# Patient Record
Sex: MALE | Race: WHITE | NOT HISPANIC OR LATINO | Employment: FULL TIME | URBAN - METROPOLITAN AREA
[De-identification: names, ages, dates, MRNs, and addresses within clinical notes are randomized per-mention and may not be internally consistent; named-entity substitution may affect disease eponyms.]

---

## 2023-08-15 ENCOUNTER — HOSPITAL ENCOUNTER (EMERGENCY)
Facility: HOSPITAL | Age: 53
Discharge: HOME | End: 2023-08-15
Attending: EMERGENCY MEDICINE
Payer: COMMERCIAL

## 2023-08-15 VITALS
BODY MASS INDEX: 39.24 KG/M2 | TEMPERATURE: 98 F | OXYGEN SATURATION: 98 % | RESPIRATION RATE: 19 BRPM | SYSTOLIC BLOOD PRESSURE: 128 MMHG | WEIGHT: 250 LBS | HEART RATE: 75 BPM | HEIGHT: 67 IN | DIASTOLIC BLOOD PRESSURE: 71 MMHG

## 2023-08-15 DIAGNOSIS — N23 RENAL COLIC: Primary | ICD-10-CM

## 2023-08-15 DIAGNOSIS — N20.0 KIDNEY STONE: ICD-10-CM

## 2023-08-15 LAB
ALBUMIN SERPL-MCNC: 4.4 G/DL (ref 3.5–5)
ALBUMIN/GLOB SERPL: 1.5 RATIO (ref 1.1–2)
ALP SERPL-CCNC: 42 UNIT/L (ref 40–150)
ALT SERPL-CCNC: 34 UNIT/L (ref 0–55)
APPEARANCE UR: CLEAR
AST SERPL-CCNC: 22 UNIT/L (ref 5–34)
BACTERIA #/AREA URNS AUTO: ABNORMAL /HPF
BASOPHILS # BLD AUTO: 0.05 X10(3)/MCL
BASOPHILS NFR BLD AUTO: 0.6 %
BILIRUB SERPL-MCNC: 0.7 MG/DL
BILIRUB UR QL STRIP.AUTO: NEGATIVE
BUN SERPL-MCNC: 7.2 MG/DL (ref 8.4–25.7)
CALCIUM SERPL-MCNC: 9.6 MG/DL (ref 8.4–10.2)
CHLORIDE SERPL-SCNC: 104 MMOL/L (ref 98–107)
CO2 SERPL-SCNC: 22 MMOL/L (ref 22–29)
COLOR UR: YELLOW
CREAT SERPL-MCNC: 0.79 MG/DL (ref 0.73–1.18)
EOSINOPHIL # BLD AUTO: 0.19 X10(3)/MCL (ref 0–0.9)
EOSINOPHIL NFR BLD AUTO: 2.3 %
ERYTHROCYTE [DISTWIDTH] IN BLOOD BY AUTOMATED COUNT: 13.7 % (ref 11.5–17)
GFR SERPLBLD CREATININE-BSD FMLA CKD-EPI: >60 MLS/MIN/1.73/M2
GLOBULIN SER-MCNC: 2.9 GM/DL (ref 2.4–3.5)
GLUCOSE SERPL-MCNC: 140 MG/DL (ref 74–100)
GLUCOSE UR QL STRIP.AUTO: NEGATIVE
HCT VFR BLD AUTO: 38.3 % (ref 42–52)
HGB BLD-MCNC: 13.3 G/DL (ref 14–18)
IMM GRANULOCYTES # BLD AUTO: 0.04 X10(3)/MCL (ref 0–0.04)
IMM GRANULOCYTES NFR BLD AUTO: 0.5 %
KETONES UR QL STRIP.AUTO: NEGATIVE
LEUKOCYTE ESTERASE UR QL STRIP.AUTO: NEGATIVE
LYMPHOCYTES # BLD AUTO: 2.83 X10(3)/MCL (ref 0.6–4.6)
LYMPHOCYTES NFR BLD AUTO: 34.7 %
MCH RBC QN AUTO: 29.4 PG (ref 27–31)
MCHC RBC AUTO-ENTMCNC: 34.7 G/DL (ref 33–36)
MCV RBC AUTO: 84.7 FL (ref 80–94)
MONOCYTES # BLD AUTO: 0.53 X10(3)/MCL (ref 0.1–1.3)
MONOCYTES NFR BLD AUTO: 6.5 %
NEUTROPHILS # BLD AUTO: 4.52 X10(3)/MCL (ref 2.1–9.2)
NEUTROPHILS NFR BLD AUTO: 55.4 %
NITRITE UR QL STRIP.AUTO: NEGATIVE
NRBC BLD AUTO-RTO: 0 %
PH UR STRIP.AUTO: 5.5 [PH]
PLATELET # BLD AUTO: 285 X10(3)/MCL (ref 130–400)
PMV BLD AUTO: 8.6 FL (ref 7.4–10.4)
POTASSIUM SERPL-SCNC: 3.7 MMOL/L (ref 3.5–5.1)
PROT SERPL-MCNC: 7.3 GM/DL (ref 6.4–8.3)
PROT UR QL STRIP.AUTO: NEGATIVE
RBC # BLD AUTO: 4.52 X10(6)/MCL (ref 4.7–6.1)
RBC #/AREA URNS AUTO: 10 /HPF
RBC UR QL AUTO: ABNORMAL
SODIUM SERPL-SCNC: 138 MMOL/L (ref 136–145)
SP GR UR STRIP.AUTO: 1.01 (ref 1–1.03)
SQUAMOUS #/AREA URNS AUTO: <5 /HPF
UROBILINOGEN UR STRIP-ACNC: 0.2
WBC # SPEC AUTO: 8.16 X10(3)/MCL (ref 4.5–11.5)
WBC #/AREA URNS AUTO: <5 /HPF

## 2023-08-15 PROCEDURE — 85025 COMPLETE CBC W/AUTO DIFF WBC: CPT | Performed by: EMERGENCY MEDICINE

## 2023-08-15 PROCEDURE — 25000003 PHARM REV CODE 250: Performed by: EMERGENCY MEDICINE

## 2023-08-15 PROCEDURE — 96375 TX/PRO/DX INJ NEW DRUG ADDON: CPT

## 2023-08-15 PROCEDURE — 63600175 PHARM REV CODE 636 W HCPCS: Performed by: EMERGENCY MEDICINE

## 2023-08-15 PROCEDURE — 80053 COMPREHEN METABOLIC PANEL: CPT | Performed by: EMERGENCY MEDICINE

## 2023-08-15 PROCEDURE — 81001 URINALYSIS AUTO W/SCOPE: CPT | Performed by: EMERGENCY MEDICINE

## 2023-08-15 PROCEDURE — 99285 EMERGENCY DEPT VISIT HI MDM: CPT | Mod: 25

## 2023-08-15 PROCEDURE — 96374 THER/PROPH/DIAG INJ IV PUSH: CPT

## 2023-08-15 RX ORDER — HYDROMORPHONE HYDROCHLORIDE 2 MG/ML
1 INJECTION, SOLUTION INTRAMUSCULAR; INTRAVENOUS; SUBCUTANEOUS
Status: COMPLETED | OUTPATIENT
Start: 2023-08-15 | End: 2023-08-15

## 2023-08-15 RX ORDER — ONDANSETRON 8 MG/1
8 TABLET, ORALLY DISINTEGRATING ORAL 3 TIMES DAILY PRN
Qty: 15 TABLET | Refills: 0 | Status: SHIPPED | OUTPATIENT
Start: 2023-08-15 | End: 2023-08-20

## 2023-08-15 RX ORDER — ONDANSETRON 2 MG/ML
8 INJECTION INTRAMUSCULAR; INTRAVENOUS
Status: COMPLETED | OUTPATIENT
Start: 2023-08-15 | End: 2023-08-15

## 2023-08-15 RX ORDER — TAMSULOSIN HYDROCHLORIDE 0.4 MG/1
0.4 CAPSULE ORAL
Status: COMPLETED | OUTPATIENT
Start: 2023-08-15 | End: 2023-08-15

## 2023-08-15 RX ORDER — KETOROLAC TROMETHAMINE 30 MG/ML
30 INJECTION, SOLUTION INTRAMUSCULAR; INTRAVENOUS
Status: COMPLETED | OUTPATIENT
Start: 2023-08-15 | End: 2023-08-15

## 2023-08-15 RX ORDER — KETOROLAC TROMETHAMINE 10 MG/1
10 TABLET, FILM COATED ORAL EVERY 6 HOURS
Qty: 20 TABLET | Refills: 0 | Status: SHIPPED | OUTPATIENT
Start: 2023-08-15 | End: 2023-08-20

## 2023-08-15 RX ORDER — HYDROCODONE BITARTRATE AND ACETAMINOPHEN 7.5; 325 MG/1; MG/1
1 TABLET ORAL EVERY 6 HOURS PRN
Qty: 15 TABLET | Refills: 0 | Status: SHIPPED | OUTPATIENT
Start: 2023-08-15 | End: 2023-08-20

## 2023-08-15 RX ADMIN — ONDANSETRON 8 MG: 2 INJECTION INTRAMUSCULAR; INTRAVENOUS at 04:08

## 2023-08-15 RX ADMIN — KETOROLAC TROMETHAMINE 30 MG: 30 INJECTION, SOLUTION INTRAMUSCULAR; INTRAVENOUS at 04:08

## 2023-08-15 RX ADMIN — TAMSULOSIN HYDROCHLORIDE 0.4 MG: 0.4 CAPSULE ORAL at 05:08

## 2023-08-15 RX ADMIN — HYDROMORPHONE HYDROCHLORIDE 1 MG: 2 INJECTION, SOLUTION INTRAMUSCULAR; INTRAVENOUS; SUBCUTANEOUS at 05:08

## 2023-08-15 NOTE — ED PROVIDER NOTES
Encounter Date: 8/15/2023       History     Chief Complaint   Patient presents with    Flank Pain     Left sided flank pain onset yesterday and worsening over last couple hrs. Hx of kidney stones. Reports dysuria     52 y/o M with hx kidney stones presents to the ED for evaluation of acute onset L flank pain, progressively worsening since yesterday. Denies fever, + discomfort w/ urination.     The history is provided by the patient.   Flank Pain  This is a new problem. The current episode started yesterday. The problem occurs constantly. The problem has been gradually worsening. Associated symptoms include abdominal pain. Pertinent negatives include no chest pain, no headaches and no shortness of breath. Nothing aggravates the symptoms.   ,   Review of patient's allergies indicates:  No Known Allergies  No past medical history on file.  No past surgical history on file.  No family history on file.     Review of Systems   Constitutional:  Negative for fever.   Respiratory:  Negative for shortness of breath.    Cardiovascular:  Negative for chest pain.   Gastrointestinal:  Positive for abdominal pain.   Genitourinary:  Positive for dysuria and flank pain.   Neurological:  Negative for headaches.       Physical Exam     Initial Vitals [08/15/23 0407]   BP Pulse Resp Temp SpO2   (!) 184/99 95 (!) 22 97.9 °F (36.6 °C) 98 %      MAP       --         Physical Exam    Nursing note and vitals reviewed.  Constitutional: He appears well-developed and well-nourished. No distress.   HENT:   Head: Normocephalic and atraumatic.   Mouth/Throat: Oropharynx is clear and moist.   Eyes: Conjunctivae are normal. No scleral icterus.   Neck: Neck supple.   Normal range of motion.  Cardiovascular:  Normal rate and regular rhythm.           Pulmonary/Chest: Breath sounds normal. No respiratory distress.   Abdominal: Abdomen is soft. He exhibits no distension. There is no abdominal tenderness.   Musculoskeletal:      Cervical back: Normal  range of motion and neck supple.     Neurological: He is alert and oriented to person, place, and time.   Skin: Skin is warm and dry.         ED Course   Procedures  Labs Reviewed   COMPREHENSIVE METABOLIC PANEL - Abnormal; Notable for the following components:       Result Value    Glucose Level 140 (*)     Blood Urea Nitrogen 7.2 (*)     All other components within normal limits   URINALYSIS, REFLEX TO URINE CULTURE - Abnormal; Notable for the following components:    Blood, UA 1+ (*)     All other components within normal limits   CBC WITH DIFFERENTIAL - Abnormal; Notable for the following components:    RBC 4.52 (*)     Hgb 13.3 (*)     Hct 38.3 (*)     All other components within normal limits   URINALYSIS, MICROSCOPIC - Abnormal; Notable for the following components:    RBC, UA 10 (*)     All other components within normal limits   CBC W/ AUTO DIFFERENTIAL    Narrative:     The following orders were created for panel order CBC auto differential.  Procedure                               Abnormality         Status                     ---------                               -----------         ------                     CBC with Differential[[id number]]        Abnormal            Final result                 Please view results for these tests on the individual orders.          Imaging Results              CT Abdomen Pelvis  Without Contrast (Final result)  Result time 08/15/23 08:43:31      Final result by Carlos Franklin MD (08/15/23 08:43:31)                   Impression:    1. No acute intraabdominal or pelvic solid organ or bowel pathology   identified. Details and findings as discussed.               Narrative:    START OF REPORT:  Technique: CT of the abdomen and pelvis was performed with axial images as   well as sagittal and coronal reconstruction images without intravenous   contrast.    Comparison: None available.    Clinical History: Left sided flank pain onset yesterday and worsening over   last  couple hrs. Hx of kidney stones. Reports dysuria.    Dosage Information: Automated Exposure Control was utilized 700.89 mGy.cm.    Findings:  Lines and Tubes: None.  Thorax:  Lungs: The visualized lung bases appear unremarkable. No focal infiltrate   or consolidation is seen.  Pleura: No effusions or thickening. No pneumothorax is seen.  Heart: The heart size is within normal limits.  Abdomen:  Abdominal Wall: No abdominal wall pathology is seen.  Liver: Mild to moderate fatty infiltration of the liver is present. The   liver otherwise appears unremarkable.  Biliary System: No intrahepatic or extrahepatic biliary duct dilatation is   seen.  Gallbladder: The gallbladder appears unremarkable.  Pancreas: The pancreas appears unremarkable.  Spleen: The spleen appears unremarkable.  Adrenals: The adrenal glands appear unremarkable.  Kidneys: A single stone measuring 10.0 mm is seen on Image 110, Series 8   lower pole of the left kidney. The left kidney otherwise appears   unremarkable with no cysts masses or hydronephrosis identified. A single   stone measuring 5.2 mm is seen on Image 56, Series 8 lower pole of the   right kidney. The right kidney otherwise appears unremarkable with no   cysts masses or hydronephrosis identified.  Bowel:  Esophagus: The visualized esophagus appears unremarkable.  Stomach: The stomach appears unremarkable.  Duodenum: Unremarkable appearing duodenum.  Small Bowel: The small bowel appears unremarkable.  Colon: Nondistended.  Appendix: The appendix appears unremarkable seen on Image 109, Series 2.  Peritoneum: No intraperitoneal free air or ascites is seen.    Pelvis:  Bladder: The bladder appears unremarkable.  Male:  Prostate gland: The prostate gland is enlarged in size.    Bony structures:  Dorsal Spine: There is moderate multilevel spondylosis of the visualized   dorsal spine.  Bony Pelvis: The visualized bony structures of the pelvis appear   unremarkable.                           Final result by Carlos Franklin MD (08/15/23 08:43:28)                   Impression:    Impression:    No acute intraabdominal or pelvic solid organ or bowel pathology identified. Details and findings as discussed.    No significant discrepancy with overnight report.      Electronically signed by: Carlos Franklin  Date:    08/15/2023  Time:    08:43               Narrative:      Technique:CT of the abdomen and pelvis was performed with axial images as well as sagittal and coronal reconstruction images without intravenous contrast.    Comparison:None available.    Clinical History:Left sided flank pain onset yesterday and worsening over last couple hrs. Hx of kidney stones. Reports dysuria.    Dosage Information:Automated Exposure Control was utilized 700.89 mGy.cm.    Findings:    Lines and Tubes:None.    Thorax:    Lungs:The visualized lung bases appear unremarkable. No focal infiltrate or consolidation is seen.    Pleura:No effusions or thickening. No pneumothorax is seen.    Heart:The heart size is within normal limits.    Abdomen:    Abdominal Wall:No abdominal wall pathology is seen.    Liver:Mild to moderate fatty infiltration of the liver is present.    Within limitation of noncontrast technique, no acute finding in liver, pancreas and the spleen identified.    Biliary System:No intrahepatic or extrahepatic biliary duct dilatation is seen.    Gallbladder:The gallbladder lumen is without calcified calculus .    Adrenals:The adrenal glands appear unremarkable.    Kidneys:A single stone measuring 10.0 mm is seen on image 110, Series 8 lower pole of the left kidney.  No honephrosis identified. A single stone measuring 5.2 is seen on image 56, Series 8 lower pole of the right kidney. The right kidney otherwise appears unremarkable with no hydronephrosis identified.    Bowel:    Esophagus:The visualized esophagus appears unremarkable.    Stomach:The stomach appears unremarkable.    Duodenum:Unremarkable appearing  duodenum.    Small Bowel:The small bowel appears unremarkable.    Colon:Nondistended.    Appendix:The appendix appears unremarkable seen on ?Image 109, Series 2?.    Peritoneum:No intraperitoneal free air or ascites is seen.    Pelvis:    Bladder:The bladder appears unremarkable.    Male:    Prostate gland:The prostate gland is enlarged in size.    Bony structures:    Dorsal Spine:There is moderate multilevel spondylosis of the visualized dorsal spine.    Bony Pelvis:The visualized bony structures of the pelvis appear unremarkable.                        Preliminary result by Nehemias Sales MD (08/15/23 05:30:30)                   Narrative:    START OF REPORT:  Technique: CT of the abdomen and pelvis was performed with axial images as well as sagittal and coronal reconstruction images without intravenous contrast.    Comparison: None available.    Clinical History: Left sided flank pain onset yesterday and worsening over last couple hrs. Hx of kidney stones. Reports dysuria.    Dosage Information: Automated Exposure Control was utilized 700.89 mGy.cm.    Findings:  Lines and Tubes: None.  Thorax:  Lungs: The visualized lung bases appear unremarkable. No focal infiltrate or consolidation is seen.  Pleura: No effusions or thickening. No pneumothorax is seen.  Heart: The heart size is within normal limits.  Abdomen:  Abdominal Wall: No abdominal wall pathology is seen.  Liver: Mild to moderate fatty infiltration of the liver is present. The liver otherwise appears unremarkable.  Biliary System: No intrahepatic or extrahepatic biliary duct dilatation is seen.  Gallbladder: The gallbladder appears unremarkable.  Pancreas: The pancreas appears unremarkable.  Spleen: The spleen appears unremarkable.  Adrenals: The adrenal glands appear unremarkable.  Kidneys: A single stone measuring 10.0 mm is seen on Image 110, Series 8 lower pole of the left kidney. The left kidney otherwise appears unremarkable with no cysts masses  or hydronephrosis identified. A single stone measuring 5.2 mm is seen on Image 56, Series 8 lower pole of the right kidney. The right kidney otherwise appears unremarkable with no cysts masses or hydronephrosis identified.  Bowel:  Esophagus: The visualized esophagus appears unremarkable.  Stomach: The stomach appears unremarkable.  Duodenum: Unremarkable appearing duodenum.  Small Bowel: The small bowel appears unremarkable.  Colon: Nondistended.  Appendix: The appendix appears unremarkable seen on Image 109, Series 2.  Peritoneum: No intraperitoneal free air or ascites is seen.    Pelvis:  Bladder: The bladder appears unremarkable.  Male:  Prostate gland: The prostate gland is enlarged in size.    Bony structures:  Dorsal Spine: There is moderate multilevel spondylosis of the visualized dorsal spine.  Bony Pelvis: The visualized bony structures of the pelvis appear unremarkable.      Impression:  1. No acute intraabdominal or pelvic solid organ or bowel pathology identified. Details and findings as discussed.                                         Medications   ketorolac injection 30 mg (30 mg Intravenous Given 8/15/23 0430)   ondansetron injection 8 mg (8 mg Intravenous Given 8/15/23 0430)   HYDROmorphone (PF) injection 1 mg (1 mg Intravenous Given 8/15/23 0515)   tamsulosin 24 hr capsule 0.4 mg (0.4 mg Oral Given 8/15/23 0515)   Medical Decision Making  Problems Addressed:  Kidney stone: acute illness or injury  Renal colic: acute illness or injury    Amount and/or Complexity of Data Reviewed  Labs: ordered.  Radiology: ordered.    Risk  Prescription drug management.        ED assessment:    Mr. Camacho presented w/ dysuria, flank pain, hx kidney stones, pain started yesterday, progressively worsening.     Differential diagnosis (including but not limited to):   Renal colic, ureterolithiasis, UTI, CONRAD, pyelonephritis, diverticulitis, back strain    ED management:   UA w/ microhematuria, no signs of infection.  See ED course below. Will provide symptomatic treatments on discharge.  I informed the patient of the risks of opioid use and the option to fill fewer than prescribed amount. ED return precautions reviewed at the bedside and provided in the written discharge instructions. All questions answered to the best of my ability.          My independent radiology interpretation:   CT abd pelvis: no acute ureterolithiasis, no hydroureteronephrosis. Comprehensive interpretation as per radiology above.       Amount and/or Complexity of Data Reviewed  Independent historian: none   Summary of history:   External data reviewed: no prior ED visits, no prior abdominal imaging  Summary of data reviewed:   Risk and benefits of testing: discussed   Labs: ordered and reviewed  Radiology: ordered and independent interpretation performed (see above or ED course)    Risk  Prescription drug management   Parenteral controlled substances   Decision regarding hospitalization  Shared decision making     Critical Care  none    I, Maryellen Lorenzo MD personally performed the history, PE, MDM, and procedures as documented above and agree with the scribe's documentation.                 ED Course as of 08/15/23 0906   Tue Aug 15, 2023   0703 CT scan with no obstructing ureterolithiasis.  There is note made of intrarenal stone, possible passing small nephrolithiasis causing renal colic.  No other acute intra-abdominal process described.  He is still having difficulty passing urine.  Bladder scan with over 400 mL in the bladder.  In and out catheterization performed and UA sent to lab. [KS]      ED Course User Index  [KS] Maryellen Lorenzo MD                 Clinical Impression:   Final diagnoses:  [N23] Renal colic (Primary)  [N20.0] Kidney stone        ED Disposition Condition    Discharge Stable          ED Prescriptions       Medication Sig Dispense Start Date End Date Auth. Provider    HYDROcodone-acetaminophen (NORCO) 7.5-325 mg per tablet Take 1  tablet by mouth every 6 (six) hours as needed for Pain. 15 tablet 8/15/2023 8/20/2023 Mrayellen Lorenzo MD    ketorolac (TORADOL) 10 mg tablet Take 1 tablet (10 mg total) by mouth every 6 (six) hours. for 5 days 20 tablet 8/15/2023 8/20/2023 Maryellen Lorenzo MD    ondansetron (ZOFRAN-ODT) 8 MG TbDL Take 1 tablet (8 mg total) by mouth 3 (three) times daily as needed (nausea). 15 tablet 8/15/2023 8/20/2023 Maryellen Lorenzo MD          Follow-up Information       Follow up With Specialties Details Why Contact Info    Your Urologist  Schedule an appointment as soon as possible for a visit       [facility] - Emergency Dept Emergency Medicine  As needed, If symptoms worsen [address] [place] [address]  [phone]             Maryellen Lorenzo MD  09/05/23 1042